# Patient Record
Sex: MALE | Race: WHITE | Employment: UNEMPLOYED | URBAN - NONMETROPOLITAN AREA
[De-identification: names, ages, dates, MRNs, and addresses within clinical notes are randomized per-mention and may not be internally consistent; named-entity substitution may affect disease eponyms.]

---
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
## 2021-09-02 ENCOUNTER — HOSPITAL ENCOUNTER (OUTPATIENT)
Age: 21
Setting detail: SPECIMEN
Discharge: HOME | End: 2021-09-02
Payer: COMMERCIAL

## 2021-09-02 ENCOUNTER — OFFICE VISIT (OUTPATIENT)
Dept: FAMILY MEDICINE CLINIC | Age: 21
End: 2021-09-02
Payer: COMMERCIAL

## 2021-09-02 VITALS
BODY MASS INDEX: 27.55 KG/M2 | DIASTOLIC BLOOD PRESSURE: 76 MMHG | HEIGHT: 69 IN | SYSTOLIC BLOOD PRESSURE: 125 MMHG | TEMPERATURE: 99.1 F | OXYGEN SATURATION: 98 % | HEART RATE: 80 BPM | WEIGHT: 186 LBS | RESPIRATION RATE: 12 BRPM

## 2021-09-02 DIAGNOSIS — Z11.52 ENCOUNTER FOR SCREENING FOR COVID-19: ICD-10-CM

## 2021-09-02 DIAGNOSIS — J06.9 VIRAL URI: Primary | ICD-10-CM

## 2021-09-02 PROCEDURE — 99212 OFFICE O/P EST SF 10 MIN: CPT | Performed by: NURSE PRACTITIONER

## 2021-09-02 ASSESSMENT — PATIENT HEALTH QUESTIONNAIRE - PHQ9
2. FEELING DOWN, DEPRESSED OR HOPELESS: 0
1. LITTLE INTEREST OR PLEASURE IN DOING THINGS: 0
SUM OF ALL RESPONSES TO PHQ QUESTIONS 1-9: 0
SUM OF ALL RESPONSES TO PHQ9 QUESTIONS 1 & 2: 0

## 2021-09-02 NOTE — PATIENT INSTRUCTIONS
Patient Education        Upper Respiratory Infection (Cold): Care Instructions  Your Care Instructions     An upper respiratory infection, or URI, is an infection of the nose, sinuses, or throat. URIs are spread by coughs, sneezes, and direct contact. The common cold is the most frequent kind of URI. The flu and sinus infections are other kinds of URIs. Almost all URIs are caused by viruses. Antibiotics won't cure them. But you can treat most infections with home care. This may include drinking lots of fluids and taking over-the-counter pain medicine. You will probably feel better in 4 to 10 days. The doctor has checked you carefully, but problems can develop later. If you notice any problems or new symptoms, get medical treatment right away. Follow-up care is a key part of your treatment and safety. Be sure to make and go to all appointments, and call your doctor if you are having problems. It's also a good idea to know your test results and keep a list of the medicines you take. How can you care for yourself at home? · To prevent dehydration, drink plenty of fluids. Choose water and other caffeine-free clear liquids until you feel better. If you have kidney, heart, or liver disease and have to limit fluids, talk with your doctor before you increase the amount of fluids you drink. · Take an over-the-counter pain medicine, such as acetaminophen (Tylenol), ibuprofen (Advil, Motrin), or naproxen (Aleve). Read and follow all instructions on the label. · Before you use cough and cold medicines, check the label. These medicines may not be safe for young children or for people with certain health problems. · Be careful when taking over-the-counter cold or flu medicines and Tylenol at the same time. Many of these medicines have acetaminophen, which is Tylenol. Read the labels to make sure that you are not taking more than the recommended dose. Too much acetaminophen (Tylenol) can be harmful.   · Get plenty of

## 2021-09-02 NOTE — PROGRESS NOTES
[address] WALK-IN  [facility] [address] [place] [address]  Dept: [phone]  Dept Fax: [phone]    Date of Visit:  9/3/2021  Patient Name: Letty Polanco   Patient :  2000     CHIEF COMPLAINT:     Letty Polanco is a 22 y.o. male who presents today is c/o of Fever (taking tylenol/advil x3 days) and Cough (productive cough x4 days)          REVIEW OF SYSTEM      [facility] with c/o of fever and cough for the last 4 days. He has been taking Tylenol 500 mg and 2 Advil every 6 hours as needed and it has been effective in managing his fever. Tuesday morning his fever was 102.5, he started Tylenol and Advil and the lowest while taking this combination has been 97.8 at home. Today in the office his fever is 99.1. He states the congestion started on Tuesday and has gradually gotten worse. He has not taken anything for the cough. He is not vaccinated. He has no knowledge of being exposed to anyone positive for COVID, but he does work and has contact frequently with multiple people throughout the day     Discussed COVID testing and he did agree to a COVID PCR test today. No further needs. REVIEWED INFORMATION      Allergies   Allergen Reactions    Penicillins        There is no problem list on file for this patient. No past medical history on file. No past surgical history on file. PHYSICAL EXAM     /76   Pulse 80   Temp 99.1 °F (37.3 °C) (Temporal)   Resp 12   Ht 5' 9\" (1.753 m)   Wt 186 lb (84.4 kg)   SpO2 98%   BMI 27.47 kg/m²    Physical Exam  Vitals reviewed. Constitutional:       Appearance: Normal appearance. HENT:      Right Ear: Tympanic membrane normal.      Left Ear: Tympanic membrane normal.      Nose: Congestion present.       Mouth/Throat:      Mouth: Mucous membranes are moist.      Pharynx: Posterior oropharyngeal erythema present. No oropharyngeal exudate. Cardiovascular:      Rate and Rhythm: Regular rhythm. Heart sounds: Normal heart sounds. Pulmonary:      Effort: Pulmonary effort is normal.      Breath sounds: Normal breath sounds. Abdominal:      General: Bowel sounds are normal.   Skin:     General: Skin is warm and dry. Neurological:      Mental Status: He is alert. ASSESSMENT/PLAN       Based on the history and exam, I suspect COVID-19. Will send out COVID19 testing. Possible treatment alterations based on the results. Patient instructed to self-quarantine until testing results are back   Tylenol as needed for fever/pain. Increase fluids, rest.   The patient indicates understanding of these issues and agrees with the plan. Educational materials provided on AVS.  Follow up if symptoms do not improve/worsen. Call with any questions or concerns. Discussed symptoms that will warrant urgent ED evaluation/treatment. 1. Viral URI      2. Encounter for screening for COVID-19    - COVID-19; Future       Return if symptoms worsen or fail to improve.     COMMUNICATION:       Electronically signed by XENIA Isidro CNP on 9/3/2021 at 9:35 AM

## 2021-09-03 LAB
SARS-COV-2: ABNORMAL
SARS-COV-2: DETECTED
SOURCE: ABNORMAL

## 2023-04-15 SDOH — HEALTH STABILITY: PHYSICAL HEALTH: ON AVERAGE, HOW MANY MINUTES DO YOU ENGAGE IN EXERCISE AT THIS LEVEL?: 60 MIN

## 2023-04-15 SDOH — HEALTH STABILITY: PHYSICAL HEALTH: ON AVERAGE, HOW MANY DAYS PER WEEK DO YOU ENGAGE IN MODERATE TO STRENUOUS EXERCISE (LIKE A BRISK WALK)?: 5 DAYS

## 2023-04-17 ENCOUNTER — OFFICE VISIT (OUTPATIENT)
Dept: FAMILY MEDICINE CLINIC | Age: 23
End: 2023-04-17
Payer: COMMERCIAL

## 2023-04-17 VITALS
HEART RATE: 66 BPM | BODY MASS INDEX: 29.77 KG/M2 | WEIGHT: 201 LBS | SYSTOLIC BLOOD PRESSURE: 126 MMHG | RESPIRATION RATE: 16 BRPM | HEIGHT: 69 IN | TEMPERATURE: 98.8 F | DIASTOLIC BLOOD PRESSURE: 72 MMHG | OXYGEN SATURATION: 95 %

## 2023-04-17 DIAGNOSIS — Z76.89 ENCOUNTER TO ESTABLISH CARE: ICD-10-CM

## 2023-04-17 DIAGNOSIS — Z00.00 ANNUAL PHYSICAL EXAM: Primary | ICD-10-CM

## 2023-04-17 PROCEDURE — 99385 PREV VISIT NEW AGE 18-39: CPT | Performed by: NURSE PRACTITIONER

## 2023-04-17 SDOH — ECONOMIC STABILITY: FOOD INSECURITY: WITHIN THE PAST 12 MONTHS, YOU WORRIED THAT YOUR FOOD WOULD RUN OUT BEFORE YOU GOT MONEY TO BUY MORE.: NEVER TRUE

## 2023-04-17 SDOH — ECONOMIC STABILITY: HOUSING INSECURITY
IN THE LAST 12 MONTHS, WAS THERE A TIME WHEN YOU DID NOT HAVE A STEADY PLACE TO SLEEP OR SLEPT IN A SHELTER (INCLUDING NOW)?: NO

## 2023-04-17 SDOH — ECONOMIC STABILITY: INCOME INSECURITY: HOW HARD IS IT FOR YOU TO PAY FOR THE VERY BASICS LIKE FOOD, HOUSING, MEDICAL CARE, AND HEATING?: NOT HARD AT ALL

## 2023-04-17 SDOH — ECONOMIC STABILITY: FOOD INSECURITY: WITHIN THE PAST 12 MONTHS, THE FOOD YOU BOUGHT JUST DIDN'T LAST AND YOU DIDN'T HAVE MONEY TO GET MORE.: NEVER TRUE

## 2023-04-17 ASSESSMENT — ENCOUNTER SYMPTOMS
SHORTNESS OF BREATH: 0
ABDOMINAL PAIN: 0
CHEST TIGHTNESS: 0
COLOR CHANGE: 0
SORE THROAT: 0
BACK PAIN: 0
ABDOMINAL DISTENTION: 0
RHINORRHEA: 0
NAUSEA: 0
CONSTIPATION: 0
COUGH: 0
DIARRHEA: 0

## 2023-04-17 ASSESSMENT — PATIENT HEALTH QUESTIONNAIRE - PHQ9
SUM OF ALL RESPONSES TO PHQ QUESTIONS 1-9: 0
SUM OF ALL RESPONSES TO PHQ QUESTIONS 1-9: 0
1. LITTLE INTEREST OR PLEASURE IN DOING THINGS: 0
SUM OF ALL RESPONSES TO PHQ9 QUESTIONS 1 & 2: 0
SUM OF ALL RESPONSES TO PHQ QUESTIONS 1-9: 0
SUM OF ALL RESPONSES TO PHQ QUESTIONS 1-9: 0
2. FEELING DOWN, DEPRESSED OR HOPELESS: 0

## 2023-04-17 NOTE — PROGRESS NOTES
Evette Ng, APRN-DARIN  [facility]  [address] [address]  [facility] [address]  Dept: [phone]  Dept Fax: [phone]       Patient ID: Guanaco Fontenot is a 25 y.o. male. HPI    Guanaco Fontenot is a 25 y.o. male New patient who presents to the office today for a first visit and to establish a relationship with a new primary care provider. Previous PCP: Quoc Urena in [address] [place] [address] [address]  last seen: 2019 or 2020    Today, the patient has no complaints he is here to establish care. The patient's past medical, surgical, social, and family history as well as his current medications and allergies were reviewed as documented in today's encounter by SUNITHA Ortega. Current Outpatient Medications on File Prior to Visit   Medication Sig Dispense Refill    fluorouracil (EFUDEX) 5 % cream Apply topically 2 times daily. 40 g 0    cimetidine (TAGAMET) 300 MG tablet Take 1 tablet by mouth 3 times daily 90 tablet 2    Formaldehyde 10 % SOLN Apply 1 applicator topically daily 1 Bottle 0     No current facility-administered medications on file prior to visit. Subjective:     Preventative care, male:  Nicotine use: never  Alcohol use: yes  Drug use: no  Dental exam: every 6 months  Eye exam: last week     Previous office notes, labs, imaging and hospital records were reviewed prior to and during encounter. Review of Systems   Constitutional:  Negative for activity change, fatigue and fever. HENT:  Negative for congestion, ear pain, rhinorrhea and sore throat. Respiratory:  Negative for cough, chest tightness and shortness of breath. Cardiovascular:  Negative for chest pain and palpitations. Gastrointestinal:  Negative for abdominal distention, abdominal pain, constipation, diarrhea and nausea. Endocrine: Negative for polydipsia, polyphagia and polyuria. Genitourinary:  Negative for difficulty urinating and dysuria.    Musculoskeletal:  Negative for arthralgias, back

## 2023-04-17 NOTE — PATIENT INSTRUCTIONS
Health Maintenance Recommendations  Exercise   I generally recommend that people of all ages try to get 150 minutes of physical activity per week and it doesnt matter how this totals up, in other words 30 minutes 5 days per week is as good as 50 minutes 3 days a week and so on. The level of activity should be such that it is able to get your heart rate up to 100 or more, for example a brisk walk should achieve this rate. Dietary Recommendations  In terms of diet, I generally recommend trying to eat a healthy well balanced diet full of fruits and vegetables. Avoid carbonated drinks and fruit juices and limit your alcohol use. Avoid processed foods wherever possible (anything that comes in a can or a box) which can be achieved by sticking to the outside walls of the grocery store where generally you will find fresh fruits/vegetables, meats, dairy, and frozen foods. Try to avoid starches in the diet where possible and minimize bread, rice, potatoes, and pasta in the diet. Specifically try to avoid gluten, which even in people that dont have a olena allergy, causes havoc in the small intestine and alters absorption of nutrients which can in turn lead to obesity. Sleep  Try to achieve a regular sleep schedule, waking and laying down at the same time each night. Most people need 7 hours per night plus or minus 2 hours. You will know that youre getting enough because you will wake feeling refreshed and not need to sleep in to catch up on weekends. Skin Care  Make sure that you dont neglect your skin. Play it safe in the sun. Use a sunblock on all of your exposed skin. The sunblock should be broad spectrum and water resistant. I do recommend an SPF 30 or higher sun screen any time that you plan to be in the sun for more than 20 minutes, even in the winter or on cloudy days (keep in mind that UV light penetrates clouds and can cause burns even on cloudy days).    Apply 20 to 30 minutes before